# Patient Record
(demographics unavailable — no encounter records)

---

## 2024-10-22 NOTE — DISCUSSION/SUMMARY
[FreeTextEntry1] : Opinion-the patient has history of chronic migraine and developed features of optic neuritis also confirmed in both eyes and the imaging studies with contrast but he has resumed his normal eye movements without any pain in the orbit or ocular muscles and currently he is oculomotor system and optic nervous system is normal and there is no facial numbness or facial weakness and his entire evaluation is unremarkable as delineated.  The etiology of his optic neuritis is not determined at this time as his entire investigation was unremarkable for multiple sclerosis and I do not find any facial or trigeminal nerve dysfunction and advised him to obtain his Lyme titers in the CSF as well as in the blood and call me with the results and if he has any episode of visual disturbance or any neurological symptoms which were explained he must immediately go to the emergency room of [facility] where he was thoroughly checked in the past and they have medical records and inform me as well.  I will follow him within 4 weeks and on a as needed basis if there is any change.  I discussed that while he does not have multiple sclerosis he should be monitored with MRI of the brain and spinal cord in the cervical area in the future if there are any symptoms and during his next visit I may require reinvestigation of Lyme disease, aquaporin antibodies and MoG antibodies and he expressed understanding and will comply.  Extensive education and counseling was undertaken.

## 2024-10-22 NOTE — HISTORY OF PRESENT ILLNESS
[FreeTextEntry1] : Mr. Guerrero a 34-year-old  who gave a history that on 9/2/2024 he developed right eye pain for 4 days with migrainous headache and with the movement of the eye and was evaluated and treated for optic neuritis with the steroids and improved completely without any vision loss.  Since last 10 days he developed left orbital pain for seconds which subsided and vision became unimpaired in both eyes.  He also developed tingling in the occipital area with numbish feeling on 9/6/2024 and had multiple imaging studies of MRI of the brain and spine and had lumbar puncture and all the reports were unremarkable.  His lumbar puncture was complicated with low back pain with left-sided radicular symptoms and all symptoms eventually subsided without any sequelae.  On 9/22/2024 he went to [facility] emergency room with a history of severe left face numbness which lasted for 36 hours and completely subsided.  He gives history of migraine since 2013 without any treatment and is usually frontal headaches bilaterally with light sensitivity and there is no family history of migraine.  Currently he is asymptomatic with respect to his eyes and there is no longer significant left occipital tingling or numbness and there is no low back pain or radicular symptoms and currently does not have any migraine headaches.  Past medical history is negative for any systemic illnesses and no history of tick bite or Lyme disease and no systemic illnesses.  He had 3 left knee surgery with ACL repair and fistulectomy and is asymptomatic.  There is no other surgical history.  He is 34 years old has no toxic habits  with 3 children in the family is healthy is a  and can form all activities of daily living including driving and his job mother had glaucoma and maternal grandfather has history of diabetes and the siblings are well there is no family history of multiple sclerosis.  I reviewed his medications and allergies. EOMI; PERRL; no drainage or redness

## 2024-10-22 NOTE — DATA REVIEWED
[de-identified] : CT angiogram at [facility] on 9/3/2024 was completely normal without any aneurysm or vascular malformation normal dural sinuses and brain parenchyma was unremarkable.  CT scan of the head was described as unremarkable  MRI of the brain with and without contrast on 9/23/2024 revealed mild enlargement and increased enhancement involving the left facial nerve and left optic nerve enhancement was noted to be more intensely on the left as compared to the right.  Cervical spine MRI was not significant including normal thoracic spine with normal spinal cord in the cervical and thoracic spine area and mild multilevel degenerative changes were recorded in the lumbar spine. [de-identified] : I reviewed the medical records of [facility] where he was admitted on 9/6/2024 and discharged on 9/8/2024 and I reviewed the lab tests and his blood chemistries were completely normal with slightly elevated blood glucose and normal C-reactive protein and his hematologic indices were unremarkable.  This admission was for optic neuritis and he was treated with steroids.  The patient brought his CSF results in his cell phone with a CSF protein of 36.3 and glucose of 58 and there were no cells IgG and IgG index including IgG ratio was completely normal and ACE levels were low and within normal limits including beta 2 microglobulin's and myelin basic protein were 1.91 which is normal and oligoclonal bands were within normal limits in the blood as well as in the CSF.  Lyme titers are not available though he recalls and will find out the normal small data and forwarded to me immediately.

## 2024-10-22 NOTE — PHYSICAL EXAM
[FreeTextEntry1] : Vital signs were recorded and unremarkable.  Head neck, ear nose and throat was unremarkable.  There is no carotid bruit, thyromegaly or lymphadenopathy.  Chest is clear and heart sounds are normal.  Abdomen is soft.  Eardrums are clear mastoids are not tender.  There is no ocular tenderness or periorbital tenderness.  There are no meningeal signs.  There is no hepatosplenomegaly abdominal is not tender bladder is not distended and pedal pulsations are normal straight leg raising test is negative and there is no rash anywhere in the body.  There is no history of tick bite or bull's-eye rash.  Though he lives in [facility].  Neurological examination was completely normal without any optic neuritis at this time with normal optic disks no apparent pupillary defect normal full extraocular movements and visual fields were normal in all 4 quadrants corneals are present facial sensations were normal bulbar functions were intact and his entire nervous system evaluation was normal as delineated. [General Appearance - In No Acute Distress] : in no acute distress [General Appearance - Alert] : alert [Oriented To Time, Place, And Person] : oriented to person, place, and time [Impaired Insight] : insight and judgment were intact [Affect] : the affect was normal [Person] : oriented to person [Place] : oriented to place [Time] : oriented to time [Concentration Intact] : normal concentrating ability [Visual Intact] : visual attention was ~T not ~L decreased [Naming Objects] : no difficulty naming common objects [Repeating Phrases] : no difficulty repeating a phrase [Writing A Sentence] : no difficulty writing a sentence [Fluency] : fluency intact [Comprehension] : comprehension intact [Reading] : reading intact [Past History] : adequate knowledge of personal past history [Cranial Nerves Optic (II)] : visual acuity intact bilaterally,  visual fields full to confrontation, pupils equal round and reactive to light [Cranial Nerves Oculomotor (III)] : extraocular motion intact [Cranial Nerves Trigeminal (V)] : facial sensation intact symmetrically [Cranial Nerves Facial (VII)] : face symmetrical [Cranial Nerves Vestibulocochlear (VIII)] : hearing was intact bilaterally [Cranial Nerves Glossopharyngeal (IX)] : tongue and palate midline [Cranial Nerves Accessory (XI - Cranial And Spinal)] : head turning and shoulder shrug symmetric [Cranial Nerves Hypoglossal (XII)] : there was no tongue deviation with protrusion [Motor Tone] : muscle tone was normal in all four extremities [Motor Strength] : muscle strength was normal in all four extremities [No Muscle Atrophy] : normal bulk in all four extremities [Sensation Tactile Decrease] : light touch was intact [Abnormal Walk] : normal gait [Balance] : balance was intact [Past-pointing] : there was no past-pointing [Tremor] : no tremor present [2+] : Ankle jerk left 2+ [Plantar Reflex Right Only] : normal on the right [Plantar Reflex Left Only] : normal on the left

## 2024-10-22 NOTE — REVIEW OF SYSTEMS
[Anxiety] : anxiety [Numbness] : numbness [Tingling] : tingling [Migraine Headache] : migraine headaches [As Noted in HPI] : as noted in HPI [Eyesight Problems] : eyesight problems [Negative] : Heme/Lymph

## 2024-10-29 NOTE — DISCUSSION/SUMMARY
[FreeTextEntry1] : Opinion-the patient has well-documented history of optic neuritis and his MRI did reveal some involvement of his facial and trigeminal nerve areas but without any neurological abnormalities in the facial or trigeminal nerve and today's evaluation is completely normal and was reassured and I advised him to obtain MOG and NMO antibodies including CRP and sinus x-rays and advised him to forward me the report as soon as possible and I will transition his care to Dr. Juan who is our MS expert for careful future follow-ups.  Education counseling and explanations were provided and he will continue his vitamin D deficiency drugs and return back for follow-up only on a as needed basis.  He expressed understanding and will pursue with my advice.

## 2024-10-29 NOTE — HISTORY OF PRESENT ILLNESS
[FreeTextEntry1] : Mr. Guerrero a 34-year-old  who was last seen in neurological consultation a week and a half ago and today returns back to my office and brought me the results of his Lyme titers in the CSF and blood which were negative from [facility] records including negative West Nile virus antibodies which were recorded.  Currently he denied any significant new symptoms but complains of bandlike frontal discomfort describing is that probable pain without throbbing pain and has history of optic neuritis but no symptoms at the present time of any visual obscuration change in color vision visual fields no nausea vomiting vertigo at this somewhat anxious because of the diagnosis of optic neuritis bilaterally with possibility of future multiple sclerosis and was reassured that there are no lesions in the brain when he was investigated at [facility] thoroughly including his CSF studies and today I advised him to obtain MOG and NMO antibodies CRP and x-ray of the paranasal sinuses and assured him that there is no evidence to suggest that this is a manic migraine headaches and probably muscle tension headaches but sinusitis should be ruled out.  He is as stated very anxious and was reassured that he will be carefully followed and I will transfer his care to Dr. Juan our MS expert who has accepted him and I will make an appointment after the aforementioned tests.  Review of systems is unremarkable and there is no interim past medical history.I reviewed his medications and allergies and reviewed his past medical data.

## 2024-10-29 NOTE — PHYSICAL EXAM
[FreeTextEntry1] : Vital signs were recorded and unremarkable.  Head neck, ear nose and throat is unremarkable.  There is no carotid bruit thyromegaly or lymphadenopathy.  Neck is supple with full range of motion.  There are no meningeal signs cervical and lumbar spine range of motion is normal pedal pulsations are normal.  His neurological evaluation was completely normal including a detailed evaluation is oculomotor functions including his visual fields there is no pale or papilledema or optic atrophy and no afferent pupillary defect temporal artery pulsations are normal and rest of his neurological examination is also completely normal as delineated. [General Appearance - In No Acute Distress] : in no acute distress [General Appearance - Alert] : alert [Oriented To Time, Place, And Person] : oriented to person, place, and time [Impaired Insight] : insight and judgment were intact [Affect] : the affect was normal [Person] : oriented to person [Place] : oriented to place [Time] : oriented to time [Concentration Intact] : normal concentrating ability [Naming Objects] : no difficulty naming common objects [Visual Intact] : visual attention was ~T not ~L decreased [Repeating Phrases] : no difficulty repeating a phrase [Writing A Sentence] : no difficulty writing a sentence [Fluency] : fluency intact [Comprehension] : comprehension intact [Reading] : reading intact [Past History] : adequate knowledge of personal past history [Cranial Nerves Optic (II)] : visual acuity intact bilaterally,  visual fields full to confrontation, pupils equal round and reactive to light [Cranial Nerves Oculomotor (III)] : extraocular motion intact [Cranial Nerves Trigeminal (V)] : facial sensation intact symmetrically [Cranial Nerves Facial (VII)] : face symmetrical [Cranial Nerves Glossopharyngeal (IX)] : tongue and palate midline [Cranial Nerves Vestibulocochlear (VIII)] : hearing was intact bilaterally [Cranial Nerves Accessory (XI - Cranial And Spinal)] : head turning and shoulder shrug symmetric [Cranial Nerves Hypoglossal (XII)] : there was no tongue deviation with protrusion [Motor Tone] : muscle tone was normal in all four extremities [No Muscle Atrophy] : normal bulk in all four extremities [Motor Strength] : muscle strength was normal in all four extremities [Sensation Tactile Decrease] : light touch was intact [Abnormal Walk] : normal gait [Balance] : balance was intact [Past-pointing] : there was no past-pointing [Tremor] : no tremor present [2+] : Ankle jerk right 2+ [Plantar Reflex Right Only] : normal on the right [Plantar Reflex Left Only] : normal on the left

## 2024-10-29 NOTE — DATA REVIEWED
[de-identified] : Today he brought me the Lyme titers studies undertaken at [facility] which were negative in the CSF and blood in addition to West Nile antibodies which were also negative and have been filed in his electronic medical records.

## 2024-10-29 NOTE — REVIEW OF SYSTEMS
[Anxiety] : anxiety [As Noted in HPI] : as noted in HPI [Tension Headache] : tension-type headaches [Negative] : Heme/Lymph

## 2024-11-12 NOTE — HISTORY OF PRESENT ILLNESS
[FreeTextEntry1] :  Mr. ELTON OGLESBY is a pleasant 34-year-old man with recently diagnosed optic neuritis, who was also noted persistently elevated blood pressure over last 2 months.  He reports multiple medical visits since the diagnosis of optic neuritis, with invariable high BP, ranging around 140-145/82-90 mm Hg, even when he checks BP at home.   Denies any chest pain, shortness of breath, lower extremity edema, or orthopnea.  He notes palpitations, usually when he tried to go to sleep or in stressful situations.  No history of syncope.  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  He reports recent onset of vertigo which does not respond to Meclizine.  He has variable left sided numbness/paresthesia, for which he is still undergoing work-up.

## 2024-11-12 NOTE — CARDIOLOGY SUMMARY
[de-identified] : 11/12/2024 Sinus Rhythm with sinus arrhythmia at average rate of 80 bpm.   Left atrial enlargement.  No prior ECG for comparison.  BORDERLINE [de-identified] : Labs from PCP reviewed on patient's phone portal - LDL of 118, Ca 10.5, + JOSE GUADALUPE, otherwise unremarkable

## 2024-12-03 NOTE — HISTORY OF PRESENT ILLNESS
Continue aspirin and statin [FreeTextEntry1] : Reason for consult: optic neuritis  HPI: ELTON OGLESBY is a 34 year old man   9/2024 - had migraine and R eye pain upon eye movement, no visual impairment, went to ED because of pain. Mike'd. Saw angelica, had a normal exam, sent for MRI brain. Had MRI brain and found optic neuritis. Got IVMP and improved. Had LP and MRIs which were reportedly unrevealing per pt.  2wks later, L face and L foot went numb. went again to [place] and had MRIs which were negative.  10/2024 - pain that felt like "nerve pain" over the L eye, constant, persists. Saw Dr. Carmichael.  11/2024 - back to the hospital because whole L side was numb. MRIs were unchanged.  Saw Dr. Mcallister, was told that he does not have MS and didn't find the optic neuritis on MRI. Saw neuro-ophtho who agreed that he might not have had optic neuritis, OCT was normal.    ROS/Current Sx: headache, upper facial pain - nurtec doesn't help, gabapentin 600mg doesn't help, motrin/advil/excedrin up to 600mg bid doesn't help, cymbalta didn't help.  wrist pain burning pain in LLE and foot paresthesias on fingertips L>R L side of body sweating at night tingling in back of head L foot numb when he takes a shower. intermittent burning sensation  PMHX: migraines  MEDS: nurtec prn  ALL: nkda  SHx: no tob, no etoh, no drugs.   FHx: NC   Vitals: unremarkable  Exam:  AO3.  Normally conversant.  Follows commands, names, and repeats.  Good attention.  PERRL, VFF, EOMI, no nystagmus, face symmetric, TUP at midline. no papilledema or pallor.  Motor:                                                  R:                               L: Del                                           5                                5 Bi                                              5                               5 Tri                                            5                               5 Wrist Extensors                      5                               5 Finger abductors                    5                               5                                         5                               5   HF                                           5                               5 KE                                           5                               5 KF                                           5                               5 DF                                           5                               5 PF                                           5                               5  Tone                                       R                               L UE                                          0                                0  LE                                          0                                0  Sensory                                RUE                      LUE                 RLE                LLE      LT                                           +                            +                      +                   + Vib                                          +                            +                      +                   + JPS                                         +                            +                      +                   + PP                                         +                            +                      +                   + Temp                                     +                            +                      +                   +  Reflexes:                                              R                             L                             Biceps                                  2                             2 BR                                        2                             2 Triceps                                2                              2 Pat                                        2                            2  AJ                                        2                             2  TOES                                    F                            F  Coordination:                                              R                             L                        FTN                                       0                             0  HARPER                                      0                            0 HTS                                      0                             0   Other                                                                             Gait: normal, can heel, toe, tandem                      Assistance:   MRI brain/orbits 9/2024 - no convincing evidence for optic neuritis (i disagree with radiology report). no brain lesions.  MRI brain and C spine 11/2024 - normal   AP: 35yo w/ pmhx of migraines who developed right eye visual disturbance and headache in 9/2024 lasting days, followed by episodes of unexplained migratory paresthesias and L forehead pain. He was initially thought to have had right optic neuritis but after review of imaging, ophthalmology exam, and OCT, this was deemed unlikely by a prior neuroimmunology specialist Dr. Mcallister, and by neuro-ophthalmologist Dr. Flood. After reviewing the data, I agree with the assessment that there is no evidence of optic neuritis on exam nor imaging, and that his initial visual disturbance may have been related to a migrainous phenomenon. I am uncertain of the etiology of his subsequent paresthesias and pain as there has been no evidence of a central neuroimmune etiology. I advised that a peripheral neuropathy work up, and potentially small fiber work up, would be advisable. Some contribution from anxiety is likely, but further work up for underlying peripheral neuropathic process is reasonable.   all questions answered, education provided, management discussed at length,  - headache medicine referral - f/u with Dr. Carmichael for EMG - RTC with me prn

## 2024-12-03 NOTE — HISTORY OF PRESENT ILLNESS
[FreeTextEntry1] : Reason for consult: optic neuritis  HPI: ELTON OGLESBY is a 34 year old man   9/2024 - had migraine and R eye pain upon eye movement, no visual impairment, went to ED because of pain. Mike'd. Saw angelica, had a normal exam, sent for MRI brain. Had MRI brain and found optic neuritis. Got IVMP and improved. Had LP and MRIs which were reportedly unrevealing per pt.  2wks later, L face and L foot went numb. went again to [facility] and had MRIs which were negative.  10/2024 - pain that felt like "nerve pain" over the L eye, constant, persists. Saw Dr. Carmichael.  11/2024 - back to the hospital because whole L side was numb. MRIs were unchanged.  Saw Dr. Mcallister, was told that he does not have MS and didn't find the optic neuritis on MRI. Saw neuro-ophtho who agreed that he might not have had optic neuritis, OCT was normal.    ROS/Current Sx: headache, upper facial pain - nurtec doesn't help, gabapentin 600mg doesn't help, motrin/advil/excedrin up to 600mg bid doesn't help, cymbalta didn't help.  wrist pain burning pain in LLE and foot paresthesias on fingertips L>R L side of body sweating at night tingling in back of head L foot numb when he takes a shower. intermittent burning sensation  PMHX: migraines  MEDS: nurtec prn  ALL: nkda  SHx: no tob, no etoh, no drugs.   FHx: NC   Vitals: unremarkable  Exam:  AO3.  Normally conversant.  Follows commands, names, and repeats.  Good attention.  PERRL, VFF, EOMI, no nystagmus, face symmetric, TUP at midline. no papilledema or pallor.  Motor:                                                  R:                               L: Del                                           5                                5 Bi                                              5                               5 Tri                                            5                               5 Wrist Extensors                      5                               5 Finger abductors                    5                               5                                         5                               5   HF                                           5                               5 KE                                           5                               5 KF                                           5                               5 DF                                           5                               5 PF                                           5                               5  Tone                                       R                               L UE                                          0                                0  LE                                          0                                0  Sensory                                RUE                      LUE                 RLE                LLE      LT                                           +                            +                      +                   + Vib                                          +                            +                      +                   + JPS                                         +                            +                      +                   + PP                                         +                            +                      +                   + Temp                                     +                            +                      +                   +  Reflexes:                                              R                             L                             Biceps                                  2                             2 BR                                        2                             2 Triceps                                2                              2 Pat                                        2                            2  AJ                                        2                             2  TOES                                    F                            F  Coordination:                                              R                             L                        FTN                                       0                             0  HARPER                                      0                            0 HTS                                      0                             0   Other                                                                             Gait: normal, can heel, toe, tandem                      Assistance:   MRI brain/orbits 9/2024 - no convincing evidence for optic neuritis (i disagree with radiology report). no brain lesions.  MRI brain and C spine 11/2024 - normal   AP: 35yo w/ pmhx of migraines who developed right eye visual disturbance and headache in 9/2024 lasting days, followed by episodes of unexplained migratory paresthesias and L forehead pain. He was initially thought to have had right optic neuritis but after review of imaging, ophthalmology exam, and OCT, this was deemed unlikely by a prior neuroimmunology specialist Dr. Mcallister, and by neuro-ophthalmologist Dr. Flood. After reviewing the data, I agree with the assessment that there is no evidence of optic neuritis on exam nor imaging, and that his initial visual disturbance may have been related to a migrainous phenomenon. I am uncertain of the etiology of his subsequent paresthesias and pain as there has been no evidence of a central neuroimmune etiology. I advised that a peripheral neuropathy work up, and potentially small fiber work up, would be advisable. Some contribution from anxiety is likely, but further work up for underlying peripheral neuropathic process is reasonable.   all questions answered, education provided, management discussed at length,  - headache medicine referral - f/u with Dr. Carmichael for EMG - RTC with me prn

## 2024-12-16 NOTE — HISTORY OF PRESENT ILLNESS
[FreeTextEntry1] : Mr. ELTON OGLESBY is a pleasant 34-year-old man with recently diagnosed optic neuritis, hyperlipidemia, who presents for follow up of elevated blood pressure and abnormal ECG.  He was advised to started HCTZ at 12.5 mg daily, counseled on low salt diet and lifestyle changes.  Today, he reports he feels better overall.  His paresthesias improved after he started Cymbalta 2.5 weeks ago.  He did not start HCTZ yet as he hopes to decrease blood pressure with lifestyle changes.  His echocardiogram showed normal findings, Holter monitoring was not completed.  He reports he was found to have peripheral neuropathy rather than optic neuritis and is now awaiting EMG.

## 2024-12-16 NOTE — CARDIOLOGY SUMMARY
[de-identified] : 11/12/2024 Sinus Rhythm with sinus arrhythmia at average rate of 80 bpm.   Left atrial enlargement.  No prior ECG for comparison.  BORDERLINE [de-identified] : Ecxho 12/5/2024 1. Left ventricular cavity is normal in size. Left ventricular wall thickness is normal. Left ventricular systolic function is normal with an ejection fraction of 67 % by Olivas's method of disks. There are no regional wall motion abnormalities seen. 2. Normal left ventricular diastolic function, with normal left ventricular filling pressure. 3. Normal right ventricular cavity size, with normal wall thickness, and normal right ventricular systolic function. 4. Mild mitral regurgitation. 5. The aortic root appears normal in size. 6. No pericardial effusion seen. 7. Estimated pulmonary artery systolic pressure is 32 mmHg.  [de-identified] : Labs from PCP reviewed on patient's phone portal - LDL of 118, Ca 10.5, + JOSE GUADALUPE, otherwise unremarkable

## 2025-01-09 NOTE — HISTORY OF PRESENT ILLNESS
[FreeTextEntry1] : Since mid - October - numnbness left face and pain in temple and across forehead - constant foe 2 months until started Cymbalta.  Now not as constant . "Zinging " and pain around "Eye Sockets".  Everyday , comes and goes .   Started Cymbalta in mid Decmeber and took a 10 days course of Diclofenac mid - December .   PMH " migraine 1-2x. year since 20s  Denies MS in family + migraine - mother   Mother - migraine and kimberlee  Father - a/w  Sleep - 7 hours - works at night  Caffeine -1-2 cups  ETOH - 2x/ wine / month  Smoking denies  Concussion as child 2x.  Pt is  in [place]    [Aura] : no aura [Dizziness] : no dizziness [Nausea] : no nausea [Photophobia] : photophobia [Phonophobia] : phonophobia [Neck Pain] : no neck pain [Scotoma] : no scotoma [Numbness] : numbness [Tingling] : tingling [Weakness] : no weakness [Scalp Tenderness] : scalp tenderness [Daily] : daily

## 2025-03-24 NOTE — HISTORY OF PRESENT ILLNESS
[FreeTextEntry1] : The patient is a 34-year-old male presenting with complaints of left-sided flank pain that began 2 weeks ago. He describes the pain as a combination of sharp and dull, achy sensations, rating it at a 6 out of 10 in intensity. The pain has been constant and stable since its onset. The patient underwent a renal ultrasound to investigate the flank pain, which he believes may be related to a spinal tap he had in September 2024, after which he experienced left leg pain. Aggravating factors include lifting, physical activity, transitioning from sitting to standing, bending, and stretching. Relief is noted with periods of rest and lying still. He denies experiencing associated symptoms such as fever, chills, nausea, vomiting, or hematuria. He also reports no personal or family history of kidney stones. Further evaluation may be necessary to determine the underlying cause of the flank pain.

## 2025-03-24 NOTE — PHYSICAL EXAM
[General Appearance - Well Developed] : well developed [Normal Appearance] : normal appearance [General Appearance - In No Acute Distress] : no acute distress [] : no respiratory distress [Skin Color & Pigmentation] : normal skin color and pigmentation [Oriented To Time, Place, And Person] : oriented to person, place, and time [Affect] : the affect was normal [Mood] : the mood was normal

## 2025-03-24 NOTE — ASSESSMENT
[FreeTextEntry1] : Patient is a 34-year-old male with history of thoracic spinal stenosis, lumbar degenerative disc disease and left flank pain.  He also has a left lower extremity radiculopathy.  Plan: 1.  Renal bladder ultrasound 2.   office follow-up in 1 month